# Patient Record
Sex: FEMALE | Race: AMERICAN INDIAN OR ALASKA NATIVE
[De-identification: names, ages, dates, MRNs, and addresses within clinical notes are randomized per-mention and may not be internally consistent; named-entity substitution may affect disease eponyms.]

---

## 2017-10-24 NOTE — EMERGENCY DEPARTMENT REPORT
ED General Adult HPI





- General


Chief complaint: High BP


Stated complaint: HIGH BP


Time Seen by Provider: 10/24/17 15:09


Source: patient


Mode of arrival: Ambulatory


Limitations: No Limitations





- History of Present Illness


Initial comments: 





This is a 27-year-old female who is approximately 12 weeks pregnant, was in 

outpatient clinic to get an elective termination of pregnancy, patient sent to 

the ER for asymptomatic hypertension.  Patient denies headache, neck pain, 

chest pain, abdominal pain and shortness of breath.  Has had a few episodes of 

nausea and vomiting.  Patient is occasionally consuming tobacco, and is 

consuming caffeinated beverages.  Currently does not have insurance, and does 

not have a primary care doctor or an OB/GYN doctor.  Hypertension is 

asymptomatic, blood pressure in the 150s, 160s, and as far as the patient knows

, does not have exacerbating or relieving factors.

















-: unknown


Severity scale (0 -10): 0


Improves with: none


Worsens with: none


Associated Symptoms: denies other symptoms, nausea/vomiting





- Related Data


 Previous Rx's











 Medication  Instructions  Recorded  Last Taken  Type


 


Doxylamine Succinate/Vit B6 1 each PO QHS PRN #30 tablet. 10/24/17 Unknown Rx





[Diclegis Dr 10-10 mg Tablet]    


 


Amina Root [Amina] 250 mg PO QID PRN #30 capsule 10/24/17 Unknown Rx


 


Methyldopa [Aldomet] 250 mg PO BID #60 tablet 10/24/17 Unknown Rx


 


Prenatal Vit Calc,Iron,Folic 1 each PO QDAY #30 tablet 10/24/17 Unknown Rx





[Prenatal Vitamins]    











 Allergies











Allergy/AdvReac Type Severity Reaction Status Date / Time


 


No Known Allergies Allergy   Verified 10/24/17 13:49














ED Review of Systems


ROS: 


Stated complaint: HIGH BP


Other details as noted in HPI





Constitutional: denies: fever


Eyes: denies: vision change


ENT: denies: epistaxis


Respiratory: denies: cough


Cardiovascular: denies: chest pain


Gastrointestinal: nausea, vomiting


Genitourinary: denies: dysuria


Musculoskeletal: denies: back pain


Skin: denies: lesions


Neurological: denies: weakness


Psychiatric: denies: anxiety





ED Past Medical Hx





- Past Medical History


Previous Medical History?: Yes


Hx Asthma: Yes





- Surgical History


Past Surgical History?: No





- Social History


Smoking Status: Current Every Day Smoker


Substance Use Type: Alcohol





- Medications


Home Medications: 


 Home Medications











 Medication  Instructions  Recorded  Confirmed  Last Taken  Type


 


Doxylamine Succinate/Vit B6 1 each PO QHS PRN #30 tablet. 10/24/17  Unknown Rx





[Diclegis Dr 10-10 mg Tablet]     


 


Amina Root [Amina] 250 mg PO QID PRN #30 capsule 10/24/17  Unknown Rx


 


Methyldopa [Aldomet] 250 mg PO BID #60 tablet 10/24/17  Unknown Rx


 


Prenatal Vit Calc,Iron,Folic 1 each PO QDAY #30 tablet 10/24/17  Unknown Rx





[Prenatal Vitamins]     














ED Physical Exam





- General


Limitations: No Limitations


General appearance: alert, in no apparent distress





- Head


Head exam: Present: atraumatic, normocephalic





- Eye


Eye exam: Present: normal appearance, EOMI.  Absent: nystagmus





- ENT


ENT exam: Present: normal exam, normal orophraynx, mucous membranes moist, 

normal external ear exam





- Neck


Neck exam: Present: normal inspection, full ROM.  Absent: tenderness, 

meningismus





- Respiratory


Respiratory exam: Present: normal lung sounds bilaterally.  Absent: respiratory 

distress, wheezes, rales, rhonchi, stridor, chest wall tenderness, accessory 

muscle use, decreased breath sounds, prolonged expiratory





- Cardiovascular


Cardiovascular Exam: Present: regular rate, normal rhythm, normal heart sounds.

  Absent: bradycardia, tachycardia, irregular rhythm, systolic murmur, 

diastolic murmur, rubs, gallop





- GI/Abdominal


GI/Abdominal exam: Present: soft, normal bowel sounds.  Absent: distended, 

tenderness, guarding, rebound, rigid, pulsatile mass





- Extremities Exam


Extremities exam: Present: normal inspection, full ROM, normal capillary 

refill.  Absent: pedal edema, joint swelling, calf tenderness





- Back Exam


Back exam: Present: normal inspection, full ROM.  Absent: tenderness, CVA 

tenderness (R), CVA tenderness (L), muscle spasm, paraspinal tenderness, 

vertebral tenderness





- Neurological Exam


Neurological exam: Present: alert, oriented X3, normal gait, other (Extraocular 

movements intact.  Tongue midline.  No facial droop.  Facial sensation intact 

to light touch in the V1, V2, V3 distribution bilaterally.  5 and 5 strength in 

4 extremities..  Sensation is intact to light touch in 4 extremities.).  Absent

: motor sensory deficit





- Psychiatric


Psychiatric exam: Present: normal affect, normal mood





- Skin


Skin exam: Present: warm, dry, intact, normal color.  Absent: rash





ED Course


 Vital Signs











  10/24/17 10/24/17 10/24/17





  13:44 14:57 16:00


 


Temperature 98.2 F 98.4 F 


 


Pulse Rate 71 89 


 


Respiratory 16 18 16





Rate   


 


Blood Pressure 184/115  


 


Blood Pressure  166/115 





[Right]   


 


O2 Sat by Pulse 100 100 100





Oximetry   














  10/24/17





  16:36


 


Temperature 98.1 F


 


Pulse Rate 87


 


Respiratory 16





Rate 


 


Blood Pressure 


 


Blood Pressure 160/110





[Right] 


 


O2 Sat by Pulse 





Oximetry 














ED Medical Decision Making





- Lab Data


Result diagrams: 


 10/24/17 13:53





 10/24/17 13:53








 Vital Signs











  10/24/17 10/24/17





  13:44 14:57


 


Temperature 98.2 F 98.4 F


 


Pulse Rate 71 89


 


Respiratory 16 18





Rate  


 


Blood Pressure 184/115 


 


Blood Pressure  166/115





[Right]  


 


O2 Sat by Pulse 100 100





Oximetry  














 Lab Results











  10/24/17 10/24/17 10/24/17 Range/Units





  13:53 13:53 13:53 


 


WBC  6.9    (4.5-11.0)  K/mm3


 


RBC  4.15    (3.65-5.03)  M/mm3


 


Hgb  13.6    (10.1-14.3)  gm/dl


 


Hct  38.3    (30.3-42.9)  %


 


MCV  92    (79-97)  fl


 


MCH  33 H    (28-32)  pg


 


MCHC  36 H    (30-34)  %


 


RDW  13.0 L    (13.2-15.2)  %


 


Plt Count  157    (140-440)  K/mm3


 


Lymph % (Auto)  20.1    (13.4-35.0)  %


 


Mono % (Auto)  9.2 H    (0.0-7.3)  %


 


Eos % (Auto)  1.0    (0.0-4.3)  %


 


Baso % (Auto)  0.3    (0.0-1.8)  %


 


Lymph #  1.4    (1.2-5.4)  K/mm3


 


Mono #  0.6    (0.0-0.8)  K/mm3


 


Eos #  0.1    (0.0-0.4)  K/mm3


 


Baso #  0.0    (0.0-0.1)  K/mm3


 


Seg Neutrophils %  69.4    (40.0-70.0)  %


 


Seg Neutrophils #  4.8    (1.8-7.7)  K/mm3


 


Sodium   139   (137-145)  mmol/L


 


Potassium   3.5 L   (3.6-5.0)  mmol/L


 


Chloride   100.8   ()  mmol/L


 


Carbon Dioxide   24   (22-30)  mmol/L


 


Anion Gap   18   mmol/L


 


BUN   7   (7-17)  mg/dL


 


Creatinine   0.5 L   (0.7-1.2)  mg/dL


 


Estimated GFR   > 60   ml/min


 


BUN/Creatinine Ratio   14   %


 


Glucose   133 H   ()  mg/dL


 


Calcium   8.7   (8.4-10.2)  mg/dL


 


Total Bilirubin   0.60   (0.1-1.2)  mg/dL


 


AST   18   (5-40)  units/L


 


ALT   12   (7-56)  units/L


 


Alkaline Phosphatase   34 L   ()  units/L


 


Total Protein   7.3   (6.3-8.2)  g/dL


 


Albumin   4.0   (3.9-5)  g/dL


 


Albumin/Globulin Ratio   1.2   %


 


Lipase   37   (13-60)  units/L


 


HCG, Qual    Positive  (Negative)  


 


HCG, Quant     (0-4)  mIU/mL


 


Urine Color     (Yellow)  


 


Urine Turbidity     (Clear)  


 


Urine pH     (5.0-7.0)  


 


Ur Specific Gravity     (1.003-1.030)  


 


Urine Protein     (Negative)  mg/dL


 


Urine Glucose (UA)     (Negative)  mg/dL


 


Urine Ketones     (Negative)  mg/dL


 


Urine Blood     (Negative)  


 


Urine Nitrite     (Negative)  


 


Urine Bilirubin     (Negative)  


 


Urine Urobilinogen     (<2.0)  mg/dL


 


Ur Leukocyte Esterase     (Negative)  


 


Urine WBC (Auto)     (0.0-6.0)  /HPF


 


Urine RBC (Auto)     (0.0-6.0)  /HPF


 


U Epithel Cells (Auto)     (0-13.0)  /HPF


 


Amorphous Crystals     


 


Urine Mucus     /HPF














  10/24/17 10/24/17 Range/Units





  13:53 14:04 


 


WBC    (4.5-11.0)  K/mm3


 


RBC    (3.65-5.03)  M/mm3


 


Hgb    (10.1-14.3)  gm/dl


 


Hct    (30.3-42.9)  %


 


MCV    (79-97)  fl


 


MCH    (28-32)  pg


 


MCHC    (30-34)  %


 


RDW    (13.2-15.2)  %


 


Plt Count    (140-440)  K/mm3


 


Lymph % (Auto)    (13.4-35.0)  %


 


Mono % (Auto)    (0.0-7.3)  %


 


Eos % (Auto)    (0.0-4.3)  %


 


Baso % (Auto)    (0.0-1.8)  %


 


Lymph #    (1.2-5.4)  K/mm3


 


Mono #    (0.0-0.8)  K/mm3


 


Eos #    (0.0-0.4)  K/mm3


 


Baso #    (0.0-0.1)  K/mm3


 


Seg Neutrophils %    (40.0-70.0)  %


 


Seg Neutrophils #    (1.8-7.7)  K/mm3


 


Sodium    (137-145)  mmol/L


 


Potassium    (3.6-5.0)  mmol/L


 


Chloride    ()  mmol/L


 


Carbon Dioxide    (22-30)  mmol/L


 


Anion Gap    mmol/L


 


BUN    (7-17)  mg/dL


 


Creatinine    (0.7-1.2)  mg/dL


 


Estimated GFR    ml/min


 


BUN/Creatinine Ratio    %


 


Glucose    ()  mg/dL


 


Calcium    (8.4-10.2)  mg/dL


 


Total Bilirubin    (0.1-1.2)  mg/dL


 


AST    (5-40)  units/L


 


ALT    (7-56)  units/L


 


Alkaline Phosphatase    ()  units/L


 


Total Protein    (6.3-8.2)  g/dL


 


Albumin    (3.9-5)  g/dL


 


Albumin/Globulin Ratio    %


 


Lipase    (13-60)  units/L


 


HCG, Qual    (Negative)  


 


HCG, Quant  71621 H   (0-4)  mIU/mL


 


Urine Color   Yellow  (Yellow)  


 


Urine Turbidity   Clear  (Clear)  


 


Urine pH   7.0  (5.0-7.0)  


 


Ur Specific Gravity   1.019  (1.003-1.030)  


 


Urine Protein   <15 mg/dl  (Negative)  mg/dL


 


Urine Glucose (UA)   150  (Negative)  mg/dL


 


Urine Ketones   Neg  (Negative)  mg/dL


 


Urine Blood   Neg  (Negative)  


 


Urine Nitrite   Neg  (Negative)  


 


Urine Bilirubin   Neg  (Negative)  


 


Urine Urobilinogen   < 2.0  (<2.0)  mg/dL


 


Ur Leukocyte Esterase   Neg  (Negative)  


 


Urine WBC (Auto)   2.0  (0.0-6.0)  /HPF


 


Urine RBC (Auto)   2.0  (0.0-6.0)  /HPF


 


U Epithel Cells (Auto)   2.0  (0-13.0)  /HPF


 


Amorphous Crystals   Few  


 


Urine Mucus   Few  /HPF














- Radiology Data


Radiology results: report reviewed, image reviewed





Obstetrics ultrasound demonstrates appropriate intrauterine pregnancy, heart 

rate





- Medical Decision Making





Differential diagnosis: Hypertension in pregnancy, asymptomatic, anxiety





Assessment and plan: 27-year-old female, last menstrual period ,  

2, para 0, history of elective dilatation and curettage/termination of pregnancy

, was at an outpatient gynecology clinic/ clinic, to have elective 

termination of pregnancy, found to have asymptomatic hypertension.  Patient has 

no symptoms today, endorsed some nausea and vomiting yesterday.  Walks with a 

steady gait, clinically sober, GCS of 15, NIH score of 0, no complaints at this 

time.





Laboratory studies unremarkable.





Patient has a symptomatically hypertension.  As per the [employer] of 

emergency physicians clinical policy:


1.  Are ED blood pressure readings accurate and reliable for screening 

asymptomatic patients for hypertension?





Level A recommendations. None specified. 








Level B recommendations. If blood pressure measurements are persistently 

elevated with a systolic blood pressure greater than 140 mm Hg or diastolic 

blood pressure greater than 90 mm Hg, the patient should be referred for follow-

up of possible hypertension and blood pressure management. 








Level C recommendations. Patients with a single elevated blood pressure reading 

may require further screening for hypertension in the outpatient setting. 





2.  Do asymptomatic patients with elevated blood pressures benefit from rapid 

lowering of their blood pressure?





Level A recommendations. None specified. 








Level B recommendations. (1) Initiating treatment for asymptomatic hypertension 

in the ED is not necessary when patients have follow-up; (2) Rapidly lowering 

blood pressure in asymptomatic patients in the ED is unnecessary and may be 

harmful in some patients; (3) When ED treatment for asymptomatic hypertension 

is initiated, blood pressure management should attempt to gradually lower blood 

pressure and should not be expected to be normalized during the initial ED 

visit. 














In addition, the patient is quite anxious about her procedure, and does admit 

to consuming caffeinated beverages.  This may be a contributor factor to her 

overall presentation.  Case management is contacted, patient will be given 

resources and information as to how to obtain emergency Medicaid for pregnancy.

  She will be started on nausea medication, prenatal vitamins, and methyldopa 

for a symptomatically elevated blood pressure.  Patient and family are 

instructed as to the importance of close outpatient follow-up with gynecology, 

I will withhold IV antihypertensives at this time given the patient is a 

symptomatically, and will likely not benefit from acute decrease of her blood 

pressure in the ER.


Critical care attestation.: 


If time is entered above; I have spent that time in minutes in the direct care 

of this critically ill patient, excluding procedure time.








ED Disposition


Clinical Impression: 


 Pregnancy, Elevated blood pressure reading





Disposition:  TO HOME OR SELFCARE


Is pt being admited?: No


Does the pt Need Aspirin: No


Condition: Stable


Instructions:  Pregnancy (ED), Hypertension (ED)


Additional Instructions: 


As we discussed, it typically requires multiple consecutive visits to establish 

the diagnosis of hypertension/elevated blood pressure.  Furthermore, as the 

patient does not appear to be symptomatically from elevated blood pressure, it 

is medically more dangerous to dramatically decreased blood pressure, then to 

simply follow it overtime.  Take the medications as directed, follow-up with a 

gynecologist to establish prenatal care and relationship within the next week.  

Return to the ER right away with fevers, chills, chest pain, shortness of breath

, intractable nausea or vomiting, confusion, inability to tolerate liquid feeds

, new, worsening or different symptoms.


Prescriptions: 


Doxylamine Succinate/Vit B6 [Diclegis Dr 10-10 mg Tablet] 1 each PO QHS PRN #30 

tablet.


 PRN Reason: Nausea


Amina Root [Amina] 250 mg PO QID PRN #30 capsule


 PRN Reason: Nausea


Methyldopa [Aldomet] 250 mg PO BID #60 tablet


Prenatal Vit Calc,Iron,Folic [Prenatal Vitamins] 1 each PO QDAY #30 tablet


Referrals: 


[facility] [Provider Group] - 3-5 Days


[facility] [Provider Group] - 3-5 Days


[facility] [Provider Group] - 3-5 Days

## 2017-10-24 NOTE — ULTRASOUND REPORT
ULTRASOUND OB LESS THAN 14 WEEKS FETUS



History: Hypertension.



Findings: A single intrauterine pregnancy is identified with heart rate 

measuring 172 beats per minute. Amniotic fluid volume appears normal. 

The placenta appears to be forming in the posterior fundal region. No 

subplacental collections.



Crown-rump length correlates with a 11 week, 4 day pregnancy. Estimated 

due date 05/11/18.



The ovaries are normal size, contour and echotexture. No adnexal cysts 

or.



Impression:

Viable single intrauterine pregnancy.

## 2017-10-24 NOTE — EMERGENCY DEPARTMENT REPORT
Stated Complaint: HIGH BP


Time Seen by Provider: 10/24/17 13:47





- HPI


History of Present Illness: 





patient is a 28 y/o female at 12 weeks gestation who presents due to nausea, 

vomiting and high blood pressure. Patient states that she was trying to get an 

 today but was sent to the ER due to her elevated BP. Patient denies 

any abdominal pain or headache. 





- ROS


Review of Systems: 





nausea, vomiting





- Exam


Physical Exam: 





no abdominal tenderness, alert and oriented x 3


MSE screening note: 


Focused history and physical exam performed.


Due to findings the following was ordered:











ED Disposition for MSE


Condition: Stable

## 2017-10-26 NOTE — EMERGENCY DEPARTMENT REPORT
ED General Adult HPI





- General


Chief complaint: High BP


Stated complaint: PREGNANT HTN


Time Seen by Provider: 10/26/17 15:41


Source: patient, RN notes reviewed, old records reviewed


Mode of arrival: Ambulatory


Limitations: No Limitations





- History of Present Illness


Initial comments: 





This is a 27-year-old female whom I have recently seen, we see my previous 

chart for the full details of her history and physical and prior past medical 

history.  Patient presents to the ER today with a complaint of hypertension.  

She was started on twice daily methyldopa, and reports that she still consuming 

tobacco, and still occasionally consuming energy drinks.  She reports her blood 

pressures have been varied, in the 160s, sometimes in the low 100s.  She denies 

headache, neck pain, chest pain, abdominal pain and shortness of breath.  She 

reports that she follow the instructions that she was given during her prior 

discharge, but has been unable to secure outpatient appointment for gynecologic 

care.





Patient reports that she checks her blood pressure multiple times during the 

day.


-: Gradual


Severity scale (0 -10): 0


Consistency: constant


Improves with: none


Worsens with: none


Associated Symptoms: denies: confusion, chest pain, cough, diaphoresis, fever/

chills, headaches, loss of appetite, malaise, nausea/vomiting, rash, shortness 

of breath, syncope, weakness





- Related Data


 Previous Rx's











 Medication  Instructions  Recorded  Last Taken  Type


 


Doxylamine Succinate/Vit B6 1 each PO QHS PRN #30 tablet. 10/24/17 Unknown Rx





[Diclegis Dr 10-10 mg Tablet]    


 


Amina Root [Amina] 250 mg PO QID PRN #30 capsule 10/24/17 Unknown Rx


 


Methyldopa [Aldomet] 250 mg PO BID #60 tablet 10/24/17 Unknown Rx


 


Prenatal Vit Calc,Iron,Folic 1 each PO QDAY #30 tablet 10/24/17 Unknown Rx





[Prenatal Vitamins]    











 Allergies











Allergy/AdvReac Type Severity Reaction Status Date / Time


 


No Known Allergies Allergy   Verified 10/24/17 13:49














ED Review of Systems


ROS: 


Stated complaint: PREGNANT HTN


Other details as noted in HPI





Constitutional: denies: fever


Eyes: denies: vision change


ENT: denies: epistaxis


Respiratory: denies: cough


Cardiovascular: denies: chest pain


Gastrointestinal: denies: abdominal pain


Musculoskeletal: denies: back pain


Skin: denies: lesions


Neurological: denies: headache


Psychiatric: denies: anxiety





ED Past Medical Hx





- Past Medical History


Previous Medical History?: Yes


Hx Hypertension: Yes


Hx Asthma: Yes





- Surgical History


Past Surgical History?: Yes


Additional Surgical History: D&C/miscarriage





- Social History


Smoking Status: Current Every Day Smoker


Substance Use Type: Alcohol





- Medications


Home Medications: 


 Home Medications











 Medication  Instructions  Recorded  Confirmed  Last Taken  Type


 


Doxylamine Succinate/Vit B6 1 each PO QHS PRN #30 tablet.dr 10/24/17  Unknown Rx





[Diclegis Dr 10-10 mg Tablet]     


 


Amina Root [Amina] 250 mg PO QID PRN #30 capsule 10/24/17  Unknown Rx


 


Methyldopa [Aldomet] 250 mg PO BID #60 tablet 10/24/17  Unknown Rx


 


Prenatal Vit Calc,Iron,Folic 1 each PO QDAY #30 tablet 10/24/17  Unknown Rx





[Prenatal Vitamins]     














ED Physical Exam





- General


Limitations: No Limitations


General appearance: alert, in no apparent distress





- Head


Head exam: Present: atraumatic, normocephalic





- Eye


Eye exam: Present: normal appearance, PERRL, EOMI, other (visual acuity intact 

to finger counting, color perception, reading at a close distance).  Absent: 

nystagmus





- ENT


ENT exam: Present: normal exam, normal orophraynx, mucous membranes moist, 

normal external ear exam





- Neck


Neck exam: Present: normal inspection, full ROM.  Absent: tenderness, 

meningismus





- Respiratory


Respiratory exam: Present: normal lung sounds bilaterally.  Absent: respiratory 

distress, wheezes, rales, rhonchi, stridor, chest wall tenderness, accessory 

muscle use, decreased breath sounds, prolonged expiratory





- Cardiovascular


Cardiovascular Exam: Present: regular rate, normal rhythm, normal heart sounds.

  Absent: bradycardia, tachycardia, irregular rhythm, systolic murmur, 

diastolic murmur, rubs, gallop





- GI/Abdominal


GI/Abdominal exam: Present: soft, normal bowel sounds.  Absent: distended, 

tenderness, guarding, rebound, rigid, pulsatile mass





- Extremities Exam


Extremities exam: Present: normal inspection, full ROM.  Absent: calf tenderness





- Back Exam


Back exam: Present: normal inspection, full ROM.  Absent: CVA tenderness (R), 

CVA tenderness (L), paraspinal tenderness, vertebral tenderness





- Neurological Exam


Neurological exam: Present: alert, oriented X3, normal gait, other (Extraocular 

movements intact.  Tongue midline.  No facial droop.  Facial sensation intact 

to light touch in the V1, V2, V3 distribution bilaterally.  5 and 5 strength in 

4 extremities..  Sensation is intact to light touch in 4 extremities.).  Absent

: motor sensory deficit





- Psychiatric


Psychiatric exam: Present: normal affect, normal mood





- Skin


Skin exam: Present: warm, dry, intact, normal color.  Absent: rash





ED Course


 Vital Signs











  10/26/17 10/26/17 10/26/17





  10:49 15:46 15:47


 


Temperature 98.1 F 98.4 F 


 


Pulse Rate 99 H 84 


 


Respiratory 18 16 16





Rate   


 


Blood Pressure 163/113  


 


Blood Pressure  152/105 





[Left]   


 


O2 Sat by Pulse 100 98 99





Oximetry   














- Reevaluation(s)


Reevaluation #1: 





10/26/17 20:20


Patient eating in the ER without difficulty, playing on the same of thumb 

without difficulty, looks quite comfortable, and in no distress.  The 

importance of medication compliance, outpatient follow-up was emphasized to 

patient and family.  In addition, the family given the phone number to case 

management to contact in the morning so they can further clarify how the 

patient may pursue emergency Medicaid.





ED Medical Decision Making





- Lab Data


Result diagrams: 


 10/26/17 11:16





 10/26/17 11:14








 Vital Signs











  10/26/17 10/26/17 10/26/17





  10:49 15:46 15:47


 


Temperature 98.1 F 98.4 F 


 


Pulse Rate 99 H 84 


 


Respiratory 18 16 16





Rate   


 


Blood Pressure 163/113  


 


Blood Pressure  152/105 





[Left]   


 


O2 Sat by Pulse 100 98 99





Oximetry   














 Lab Results











  10/26/17 10/26/17 10/26/17 Range/Units





  11:03 11:14 11:16 


 


WBC    6.9  (4.5-11.0)  K/mm3


 


RBC    4.31  (3.65-5.03)  M/mm3


 


Hgb    13.6  (10.1-14.3)  gm/dl


 


Hct    40.6  (30.3-42.9)  %


 


MCV    94  (79-97)  fl


 


MCH    32  (28-32)  pg


 


MCHC    34  (30-34)  %


 


RDW    13.0 L  (13.2-15.2)  %


 


Plt Count    160  (140-440)  K/mm3


 


Lymph % (Auto)    22.6  (13.4-35.0)  %


 


Mono % (Auto)    9.2 H  (0.0-7.3)  %


 


Eos % (Auto)    1.6  (0.0-4.3)  %


 


Baso % (Auto)    0.4  (0.0-1.8)  %


 


Lymph #    1.6  (1.2-5.4)  K/mm3


 


Mono #    0.6  (0.0-0.8)  K/mm3


 


Eos #    0.1  (0.0-0.4)  K/mm3


 


Baso #    0.0  (0.0-0.1)  K/mm3


 


Seg Neutrophils %    66.2  (40.0-70.0)  %


 


Seg Neutrophils #    4.6  (1.8-7.7)  K/mm3


 


Sodium   137   (137-145)  mmol/L


 


Potassium   4.2   (3.6-5.0)  mmol/L


 


Chloride   101.3   ()  mmol/L


 


Carbon Dioxide   25   (22-30)  mmol/L


 


Anion Gap   15   mmol/L


 


BUN   7   (7-17)  mg/dL


 


Creatinine   0.6 L   (0.7-1.2)  mg/dL


 


Estimated GFR   > 60   ml/min


 


BUN/Creatinine Ratio   12   %


 


Glucose   65   ()  mg/dL


 


Calcium   8.8   (8.4-10.2)  mg/dL


 


Urine Color  Yellow    (Yellow)  


 


Urine Turbidity  Clear    (Clear)  


 


Urine pH  7.0    (5.0-7.0)  


 


Ur Specific Gravity  1.016    (1.003-1.030)  


 


Urine Protein  <15 mg/dl    (Negative)  mg/dL


 


Urine Glucose (UA)  Neg    (Negative)  mg/dL


 


Urine Ketones  Neg    (Negative)  mg/dL


 


Urine Blood  Neg    (Negative)  


 


Urine Nitrite  Neg    (Negative)  


 


Urine Bilirubin  Neg    (Negative)  


 


Urine Urobilinogen  < 2.0    (<2.0)  mg/dL


 


Ur Leukocyte Esterase  Neg    (Negative)  


 


Urine WBC (Auto)  3.0    (0.0-6.0)  /HPF


 


Urine RBC (Auto)  1.0    (0.0-6.0)  /HPF


 


U Epithel Cells (Auto)  4.0    (0-13.0)  /HPF


 


Urine Bacteria (Auto)  1+    (Negative)  /HPF


 


Amorphous Crystals  Few    


 


Urine Mucus  Few    /HPF














- Medical Decision Making





Differential diagnosis: Hypertension, anxiety, pregnancy





Assessment and plan: 27-year-old female, previously evaluated by me, recently 

prescribed methyldopa for elevated blood pressure in pregnancy, presenting with 

multiple elevated blood pressure readings at home.  Patient is quite anxious 

about her blood pressure, and this is most likely helping to induce elevated 

blood pressure.  Patient is afebrile with reassuring vital signs with the 

exception of elevated blood pressure and the ER, has a GCS of 15, with an NIH 

score of 0, and clinically appears quite comfortable.  Reassured patient that 

she should only take her blood pressure once in the  morning and once at night, 

to alleviate anxiety about her elevated blood pressure.  She is also encouraged 

to follow up with outpatient gynecology as previously instructed.  The patient 

is quite comfortable and appears relieved, and states she will do so.


Critical care attestation.: 


If time is entered above; I have spent that time in minutes in the direct care 

of this critically ill patient, excluding procedure time.








ED Disposition


Clinical Impression: 


 Pregnancy, Elevated blood pressure reading





Disposition: DC-01 TO HOME OR SELFCARE


Is pt being admited?: No


Does the pt Need Aspirin: No


Condition: Stable


Additional Instructions: 


Increase methyldopa to 250 milligrams every 8 hours.  Follow up with a 

gynecologist/OB/GYN doctor as soon as possible to establish a relationship.  

Check blood pressure once in the morning, and once at night, and discontinue 

the consumption of tobacco/cigarettes.  Avoid consumption of stimulating 

substances, such as soda, energy drinks.  Return to the ER went away with fevers

, chills, chest pain, shortness of breath, intractable nausea or vomiting, 

confusion, inability tolerate liquid feeds.


Referrals: 


[facility] [Primary Care Provider] - 3-5 Days


[facility] [Provider Group] - 3-5 Days


[facility] [Provider Group] - 3-5 Days


[facility] [Provider Group] - 3-5 Days

## 2018-04-23 ENCOUNTER — HOSPITAL ENCOUNTER (INPATIENT)
Dept: HOSPITAL 5 - LD | Age: 28
LOS: 1 days | Discharge: LEFT BEFORE DISCHARGE | DRG: 781 | End: 2018-04-24
Attending: OBSTETRICS & GYNECOLOGY | Admitting: OBSTETRICS & GYNECOLOGY
Payer: MEDICAID

## 2018-04-23 DIAGNOSIS — O11.3: Primary | ICD-10-CM

## 2018-04-23 DIAGNOSIS — Z53.21: ICD-10-CM

## 2018-04-23 DIAGNOSIS — Z83.3: ICD-10-CM

## 2018-04-23 DIAGNOSIS — Z82.49: ICD-10-CM

## 2018-04-23 DIAGNOSIS — J45.909: ICD-10-CM

## 2018-04-23 DIAGNOSIS — Z3A.37: ICD-10-CM

## 2018-04-23 DIAGNOSIS — Z91.19: ICD-10-CM

## 2018-04-23 DIAGNOSIS — O99.513: ICD-10-CM

## 2018-04-23 LAB
ALT SERPL-CCNC: 8 UNITS/L (ref 7–56)
HCT VFR BLD CALC: 34.9 % (ref 30.3–42.9)
HGB BLD-MCNC: 12.4 GM/DL (ref 10.1–14.3)
MCH RBC QN AUTO: 33 PG (ref 28–32)
MCHC RBC AUTO-ENTMCNC: 36 % (ref 30–34)
MCV RBC AUTO: 93 FL (ref 79–97)
PLATELET # BLD: 157 K/MM3 (ref 140–440)
RBC # BLD AUTO: 3.76 M/MM3 (ref 3.65–5.03)
URATE SERPL-MCNC: 4.2 MG/DL (ref 3.5–7.6)

## 2018-04-23 PROCEDURE — 36415 COLL VENOUS BLD VENIPUNCTURE: CPT

## 2018-04-23 PROCEDURE — 86901 BLOOD TYPING SEROLOGIC RH(D): CPT

## 2018-04-23 PROCEDURE — 84450 TRANSFERASE (AST) (SGOT): CPT

## 2018-04-23 PROCEDURE — 86900 BLOOD TYPING SEROLOGIC ABO: CPT

## 2018-04-23 PROCEDURE — 84460 ALANINE AMINO (ALT) (SGPT): CPT

## 2018-04-23 PROCEDURE — 82565 ASSAY OF CREATININE: CPT

## 2018-04-23 PROCEDURE — 83615 LACTATE (LD) (LDH) ENZYME: CPT

## 2018-04-23 PROCEDURE — 3E0P7VZ INTRODUCTION OF HORMONE INTO FEMALE REPRODUCTIVE, VIA NATURAL OR ARTIFICIAL OPENING: ICD-10-PCS | Performed by: PEDIATRICS

## 2018-04-23 PROCEDURE — 84550 ASSAY OF BLOOD/URIC ACID: CPT

## 2018-04-23 PROCEDURE — 85027 COMPLETE CBC AUTOMATED: CPT

## 2018-04-23 PROCEDURE — 86592 SYPHILIS TEST NON-TREP QUAL: CPT

## 2018-04-23 PROCEDURE — 86850 RBC ANTIBODY SCREEN: CPT

## 2018-04-23 PROCEDURE — 59200 INSERT CERVICAL DILATOR: CPT

## 2018-04-23 NOTE — HISTORY AND PHYSICAL REPORT
History of Present Illness


Date of examination: 18


Date of admission: 


18 13:31





Chief complaint: 





sent for induction of labor 


History of present illness: 





Pt is a 28 year old -American female  ERMA 18 at 37w3d who 

presents to the office after loss of follow up since 34 wks. She has a h/o non-

compliance and chronic hypertension with superimposed preeclampsia on Aldomet 

250 mg BID. She denies headache, blurry vision and RUQ pain at this time. She 

reports rare contractions, and denies vaginal bleeding and leakage of fluid. 

She has had limited prenatal care at [facility] with comanagement 

with AMFM secondary to chronic hypertension. Her GBS status is unknown. 





Past History


Past Medical History: asthma, hypertension (per HPI )


Past Surgical History: no surgical history


GYN History: trichomonas (treated this pregnancy, with negative test of cure )


Family/Genetic History: diabetes, hypertension


Social history: no significant social history





- Obstetrical History


Expected Date of Delivery: 18 (3)


Actual Gestation: 37 Week(s) 3 Day(s) 


: 2


Para: 0


Hx # Term Pregnancies: 0


Number of  Pregnancies: 0


Spontaneous Abortions: 0


Induced : 1


Number of Living Children: 0





Medications and Allergies


 Allergies











Allergy/AdvReac Type Severity Reaction Status Date / Time


 


No Known Allergies Allergy   Verified 10/24/17 13:49











 Home Medications











 Medication  Instructions  Recorded  Confirmed  Last Taken  Type


 


Doxylamine Succinate/Vit B6 1 each PO QHS PRN #30 tablet. 10/24/17  Unknown Rx





[Diclegis Dr 10-10 mg Tablet]     


 


Amina Root [Amina] 250 mg PO QID PRN #30 capsule 10/24/17  Unknown Rx


 


Methyldopa [Aldomet] 250 mg PO BID #60 tablet 10/24/17  Unknown Rx


 


Prenatal Vit Calc,Iron,Folic 1 each PO QDAY #30 tablet 10/24/17  Unknown Rx





[Prenatal Vitamins]     











Active Meds: 


Active Medications





Butorphanol Tartrate (Stadol)  2 mg IV Q2H PRN


   PRN Reason: Pain , Severe (7-10)


Dinoprostone (Cervidil)  10 mg VG ONCE ONE


   Stop: 18 14:35


Ephedrine Sulfate (Ephedrine Sulfate)  10 mg IV Q2M PRN


   PRN Reason: Hypotension


Fentanyl (Sublimaze)  100 mcg IV Q2H PRN


   PRN Reason: Labor Pain


Ampicillin Sodium (Ampicillin/Ns 1 Gm/50 Ml)  1 gm in 50 mls @ 100 mls/hr IV 

Q4H NAWAF; Protocol


Ampicillin Sodium (Polycillin/Ns 2 Gm/100 Ml)  2 gm in 100 mls @ 100 mls/hr IV 

ONCE ONE; Protocol


   Stop: 18 14:51


Lactated Ringer's (Lactated Ringers)  1,000 mls @ 125 mls/hr IV AS DIRECT NAWAF


Oxytocin/Sodium Chloride (Pitocin/Ns 20 Unit/1000ml Drip)  20 units in 1,000 

mls @ 125 mls/hr IV AS DIRECT NAWAF


Oxytocin/Sodium Chloride (Pitocin/Ns 30 Unit/500ml)  30 units in 500 mls @ 4 mls

/hr IV TITR NAWAF; Protocol


Methyldopa (Aldomet)  250 mg PO Q12HR NAWAF


Mineral Oil (Mineral Oil)  30 ml PO QHS PRN


   PRN Reason: Constipation


Naloxone HCl (Narcan 0.4 Mg/1 Ml)  0.1 mg IV Q2MIN PRN


   PRN Reason: Res Rate </= 8 or 02 SAT < 92%


Ondansetron HCl (Zofran)  4 mg IV Q8H PRN


   PRN Reason: Nausea And Vomiting


Terbutaline Sulfate (Brethine)  0.25 mg SUB-Q ONCE PRN


   PRN Reason: Hyperstimulation/Hypertonicity


Terbutaline Sulfate (Brethine)  0.25 mg IVP ONCE PRN


   PRN Reason: Hyperstimulation/Hypertonicity











Review of Systems


All systems: negative





- Physical Exam


Breasts: Positive: deferred


Cardiovascular: Regular rate


Lungs: Positive: Clear to auscultation


Abdomen: Positive: soft (gravid )


Genitourinary (Female): Positive: normal external genitalia


Uterus: Positive: enlarged (gravid )


Extremities: Positive: edema (trace )





- Obstetrical


FHR: auscultation normal


Uterine Contraction Monitor Mode: External


Uterine Contraction Pattern: Absent


Uterine Tone Measurement Phase: Resting





Results


All other labs normal.








Assessment and Plan





A: IUP at 37w3d


    Chronic HTN with superimposed Preeclampsia- currently normotensive 


    Non-compliance


    GBS unknown 


    Asthma 





P: Admit to labor and delivery for induction of labor


    GBS prophylaxis 


    Cervidil induction


    PIH panel 


    Magnesium sulfate for signs of severe disease

## 2018-04-24 VITALS — DIASTOLIC BLOOD PRESSURE: 72 MMHG | SYSTOLIC BLOOD PRESSURE: 114 MMHG

## 2018-04-24 PROCEDURE — 3E033VJ INTRODUCTION OF OTHER HORMONE INTO PERIPHERAL VEIN, PERCUTANEOUS APPROACH: ICD-10-PCS | Performed by: PEDIATRICS

## 2018-04-24 RX ADMIN — BUTORPHANOL TARTRATE PRN MG: 2 INJECTION, SOLUTION INTRAMUSCULAR; INTRAVENOUS at 19:30

## 2018-04-24 RX ADMIN — BUTORPHANOL TARTRATE PRN MG: 2 INJECTION, SOLUTION INTRAMUSCULAR; INTRAVENOUS at 12:55

## 2018-04-24 RX ADMIN — BUTORPHANOL TARTRATE PRN MG: 2 INJECTION, SOLUTION INTRAMUSCULAR; INTRAVENOUS at 09:20

## 2018-04-24 RX ADMIN — SODIUM CHLORIDE, SODIUM LACTATE, POTASSIUM CHLORIDE, AND CALCIUM CHLORIDE SCH MLS/HR: .6; .31; .03; .02 INJECTION, SOLUTION INTRAVENOUS at 12:55

## 2018-04-24 RX ADMIN — FENTANYL CITRATE PRN MCG: 50 INJECTION, SOLUTION INTRAMUSCULAR; INTRAVENOUS at 02:01

## 2018-04-24 RX ADMIN — SODIUM CHLORIDE, SODIUM LACTATE, POTASSIUM CHLORIDE, AND CALCIUM CHLORIDE SCH MLS/HR: .6; .31; .03; .02 INJECTION, SOLUTION INTRAVENOUS at 00:05

## 2018-04-24 RX ADMIN — FENTANYL CITRATE PRN MCG: 50 INJECTION, SOLUTION INTRAMUSCULAR; INTRAVENOUS at 16:47

## 2018-04-24 NOTE — EVENT NOTE
Date: 04/24/18





Pt uncomfortable with contractions. FHTs Category II. SVE: fingertip. Plan to 

allow pt to shower and and eat breakfast and continue induction with 

misoprostol. Closely monitor maternal and fetal status.

## 2018-04-24 NOTE — EVENT NOTE
Date: 04/24/18





Pt very uncomfortable with contractions. Category I tracing. SVE: fingertip s/p 

12 hrs of cervidil. Antonio too frequently for cytotec. Begin low dose 

pitocin for cervical ripening.

## 2018-04-25 NOTE — EVENT NOTE
Date: 04/25/18





Late entry. Pt expressed discontent secondary to her cervix not changing during 

the induction process, despite being at the hospital for fewer then 30 hours at 

that time, and after being informed that her induction could take two to three 

days. The patient decided that she wanted to go home and come back when she was 

in labor against medical advice. She completed the AMA form and left the 

hospital on the evening of 4/24/18.

## 2018-04-25 NOTE — DISCHARGE SUMMARY
Providers





- Providers


Date of Admission: 


04/23/18 13:31





Date of discharge: 04/24/18


Attending physician: 


WENDY TRIPLETT





Primary care physician: 


WENDY TRIPLETT








Hospitalization


Reason for admission: induction of labor


Discharge diagnosis: other (chronic hypertension with superimposed preeclampsia

, IUP at 37 wks )


Hospital course: 





Pt was admitted for induction of labor per [facility] recommendations for delivery at 

37 wks secondary to chronic hypertension with superimposed preeclampsia. She 

received cervidil and IV pitocin for about 30 hours with minimal cervical 

change over the time. The patient then decided that she no longer wanted to 

continue the induction and that she wanted to go home. She signed the Against 

Medical Advice form and went home undelivered. 


Condition at discharge: Stable


Disposition: DC-07 LEFT AGAINST MED ADVICE





- Discharge Diagnoses


(1) Pre-eclampsia superimposed on chronic hypertension


Status: Acute   





(2) Pregnancy


Status: Acute   


Qualifiers: 


   Weeks of gestation: 37 weeks   Qualified Code(s): Z3A.37 - 37 weeks 

gestation of pregnancy   





(3) Insufficient prenatal care in third trimester


Status: Acute   





(4) Non-compliant pregnant patient


Status: Acute   


Qualifiers: 


   Trimester: third trimester   Qualified Code(s): O09.893 - Supervision of 

other high risk pregnancies, third trimester; Z91.19 - Patient's noncompliance 

with other medical treatment and regimen   





Plan





- Provider Discharge Summary


Additional instructions: 


[]  Smoking cessation referral if applicable(refer to patient education folder 

for contact #)


[]  Refer to [facility] Booklet








Call your doctor immediately for:


* Fever > 100.5


* Heavy vaginal bleeding ( >1 pad per hour)


* Severe persistent headache


* Shortness of breath


* Reddened, hot, painful area to leg or breast


* Drainage or odor from incision.





* Keep incision clean and dry at all times and follow doctor's instructions 

regarding bathing/showering











- Follow up plan


Follow up: 


WENDY TRIPLETT MD [Primary Care Provider] - 7 Days

## 2018-04-27 ENCOUNTER — HOSPITAL ENCOUNTER (OUTPATIENT)
Dept: HOSPITAL 5 - TRG | Age: 28
End: 2018-04-27
Attending: OBSTETRICS & GYNECOLOGY
Payer: MEDICAID

## 2018-04-27 DIAGNOSIS — O47.03: ICD-10-CM

## 2018-04-27 DIAGNOSIS — F17.200: ICD-10-CM

## 2018-04-27 DIAGNOSIS — Z3A.38: ICD-10-CM

## 2018-04-27 DIAGNOSIS — O99.333: ICD-10-CM

## 2018-04-27 DIAGNOSIS — O11.3: Primary | ICD-10-CM

## 2018-04-27 LAB
ALT SERPL-CCNC: < 5 UNITS/L (ref 7–56)
BILIRUB UR QL STRIP: (no result)
BLOOD UR QL VISUAL: (no result)
HCT VFR BLD CALC: 34.4 % (ref 30.3–42.9)
HGB BLD-MCNC: 12.1 GM/DL (ref 10.1–14.3)
MCH RBC QN AUTO: 33 PG (ref 28–32)
MCHC RBC AUTO-ENTMCNC: 35 % (ref 30–34)
MCV RBC AUTO: 93 FL (ref 79–97)
PH UR STRIP: 6 [PH] (ref 5–7)
PLATELET # BLD: 149 K/MM3 (ref 140–440)
PROT UR STRIP-MCNC: (no result) MG/DL
RBC # BLD AUTO: 3.71 M/MM3 (ref 3.65–5.03)
RBC #/AREA URNS HPF: 1 /HPF (ref 0–6)
URATE SERPL-MCNC: 5 MG/DL (ref 3.5–7.6)
UROBILINOGEN UR-MCNC: < 2 MG/DL (ref ?–2)
WBC #/AREA URNS HPF: < 1 /HPF (ref 0–6)

## 2018-04-27 PROCEDURE — 82565 ASSAY OF CREATININE: CPT

## 2018-04-27 PROCEDURE — 84450 TRANSFERASE (AST) (SGOT): CPT

## 2018-04-27 PROCEDURE — 84550 ASSAY OF BLOOD/URIC ACID: CPT

## 2018-04-27 PROCEDURE — 81001 URINALYSIS AUTO W/SCOPE: CPT

## 2018-04-27 PROCEDURE — 83615 LACTATE (LD) (LDH) ENZYME: CPT

## 2018-04-27 PROCEDURE — 86901 BLOOD TYPING SEROLOGIC RH(D): CPT

## 2018-04-27 PROCEDURE — 36415 COLL VENOUS BLD VENIPUNCTURE: CPT

## 2018-04-27 PROCEDURE — 84460 ALANINE AMINO (ALT) (SGPT): CPT

## 2018-04-27 PROCEDURE — 76815 OB US LIMITED FETUS(S): CPT

## 2018-04-27 PROCEDURE — 85018 HEMOGLOBIN: CPT

## 2018-04-27 PROCEDURE — 85027 COMPLETE CBC AUTOMATED: CPT

## 2018-04-27 PROCEDURE — 85014 HEMATOCRIT: CPT

## 2018-04-27 PROCEDURE — 86900 BLOOD TYPING SEROLOGIC ABO: CPT

## 2018-04-27 PROCEDURE — 86850 RBC ANTIBODY SCREEN: CPT

## 2018-04-27 PROCEDURE — 76819 FETAL BIOPHYS PROFIL W/O NST: CPT

## 2018-04-27 RX ADMIN — FENTANYL CITRATE PRN MCG: 50 INJECTION, SOLUTION INTRAMUSCULAR; INTRAVENOUS at 22:42

## 2018-04-27 NOTE — ULTRASOUND REPORT
History: Fetal well being. HIWOT.



Findings:

Gestation: Single



Fetal Position: Cephalic



Amniotic Fluid: 

  HIWOT = 11.3 cm



Fetal Heart Rate:

  137 BPM



BIOPHYSICAL PROFILE:



2 - Fetal breathing movements



2 - Fetal movements



2 - Fetal posture and tone



2 - Qualitative amniotic fluid volume



8 - TOTAL SCORE OF POSSIBLE 8



Fetal Heart Rate (bpm) 137

## 2018-04-27 NOTE — HISTORY AND PHYSICAL REPORT
History of Present Illness


Date of examination: 18


Date of admission: 





18





Chief complaint: 


Back again for IOL per MFM for superimposed preeclampsia 





History of present illness: 


This is a 29 yo  at 38 weeks that was here several days for IOL secondary 

to recommendations of MFM for IOL. Patietn stayed for 2 days and then left AMA. 

Dr. Up spoke with patient yesterday and convinced the patient to return to 

Hospitals in [place] and patient came today for IOL. The patient is a patient of [facility]. 

She has cHTN on aldomet 250mg po bid She was treated for trich in this 

pregnancy with raeann neg. She also has asthma 








Past History


Past Medical History: asthma, hypertension


Past Surgical History: no surgical history


Family/Genetic History: diabetes, heart disease


Social history: single.  denies: smoking, alcohol abuse, prescription drug abuse





- Obstetrical History


Expected Date of Delivery: 18


Actual Gestation: 38 Week(s) 0 Day(s) 


: 2


Para: 0


Hx # Term Pregnancies: 0


Number of  Pregnancies: 0


Spontaneous Abortions: 0


Induced : 1


Number of Living Children: 0





Medications and Allergies


 Allergies











Allergy/AdvReac Type Severity Reaction Status Date / Time


 


No Known Allergies Allergy   Verified 10/24/17 13:49











 Home Medications











 Medication  Instructions  Recorded  Confirmed  Last Taken  Type


 


Doxylamine Succinate/Vit B6 1 each PO QHS PRN #30 tablet. 10/24/17 04/23/18 

Unknown Rx





[Diclegis Dr 10-10 mg Tablet]     


 


Amina Root [Amina] 250 mg PO QID PRN #30 capsule 10/24/17 04/23/18 Unknown Rx


 


Methyldopa [Aldomet] 250 mg PO BID #60 tablet 10/24/17 04/23/18 Unknown Rx


 


Prenatal Vit Calc,Iron,Folic 1 each PO QDAY #30 tablet 10/24/17 04/23/18 04/23/

18 08:00 Rx





[Prenatal Vitamins]     











Active Meds: 


Active Medications





Butorphanol Tartrate (Stadol)  2 mg IV Q2H PRN


   PRN Reason: Pain , Severe (7-10)


Ephedrine Sulfate (Ephedrine Sulfate)  10 mg IV Q2M PRN


   PRN Reason: Hypotension


Fentanyl (Sublimaze)  100 mcg IV Q2H PRN


   PRN Reason: Labor Pain


Lactated Ringer's (Lactated Ringers)  1,000 mls @ 125 mls/hr IV AS DIRECT NAWAF


Mineral Oil (Mineral Oil)  30 ml PO QHS PRN


   PRN Reason: Constipation


Naloxone HCl (Narcan 0.4 Mg/1 Ml)  0.1 mg IV Q2MIN PRN


   PRN Reason: Res Rate </= 8 or 02 SAT < 92%


Ondansetron HCl (Zofran)  4 mg IV Q8H PRN


   PRN Reason: Nausea And Vomiting


Promethazine HCl (Phenergan)  25 mg PO Q6H PRN


   PRN Reason: Nausea And Vomiting


Terbutaline Sulfate (Brethine)  0.25 mg SUB-Q ONCE PRN


   PRN Reason: Hyperstimulation/Hypertonicity


Terbutaline Sulfate (Brethine)  0.25 mg IVP ONCE PRN


   PRN Reason: Hyperstimulation/Hypertonicity











Review of Systems


All systems: negative





- Vital Signs


Vital signs: 


 Vital Signs











Temp Resp BP


 


 97.9 F   16   122/88 


 


 18 14:00  18 14:00  18 14:00








 











Temp Pulse Resp BP Pulse Ox


 


 97.9 F      16   122/88    


 


 18 14:00     18 14:00  18 14:00   














- Physical Exam


Breasts: Positive: normal


Cardiovascular: Regular rate, Normal S1


Lungs: Positive: Clear to auscultation, Normal air movement


Abdomen: Positive: normal appearance, soft, normal bowel sounds.  Negative: 

distention, tenderness, guarding


Genitourinary (Female): Positive: normal external genitalia, normal perenium


Vulva: both: normal


Vagina: Positive: normal moisture


Uterus: Positive: normal size


Anus/Rectum: Positive: normal perianal skin


Extremities: Positive: normal


Deep Tendon Reflex Grade: Normal +2





- Obstetrical


Cervical Dilatation: 0.5


Cervical Effacement Percentage: 20


Fetal station: -1


Uterine Contraction Pattern: Absent


Uterine Tone Measurement Phase: Resting





Results


All other labs normal.








Assessment and Plan





A/P IUP 37 weeks


     cHTN/superimposed pree per MFM


     recommendations IOL 


     start with cervidil 


     non compliant GBS was not performed will need to treat in active labor

## 2018-04-28 RX ADMIN — ZOLPIDEM TARTRATE PRN MG: 10 TABLET, FILM COATED ORAL at 01:07

## 2018-04-28 RX ADMIN — ZOLPIDEM TARTRATE PRN MG: 10 TABLET, FILM COATED ORAL at 22:47

## 2018-04-28 RX ADMIN — SODIUM CHLORIDE, SODIUM LACTATE, POTASSIUM CHLORIDE, AND CALCIUM CHLORIDE SCH MLS/HR: .6; .31; .03; .02 INJECTION, SOLUTION INTRAVENOUS at 17:29

## 2018-04-28 RX ADMIN — SODIUM CHLORIDE, SODIUM LACTATE, POTASSIUM CHLORIDE, AND CALCIUM CHLORIDE SCH MLS/HR: .6; .31; .03; .02 INJECTION, SOLUTION INTRAVENOUS at 06:14

## 2018-04-28 RX ADMIN — ONDANSETRON PRN MG: 2 INJECTION INTRAMUSCULAR; INTRAVENOUS at 00:00

## 2018-04-28 RX ADMIN — METHYLDOPA SCH MG: 250 TABLET ORAL at 22:46

## 2018-04-28 RX ADMIN — FENTANYL CITRATE PRN MCG: 50 INJECTION, SOLUTION INTRAMUSCULAR; INTRAVENOUS at 06:02

## 2018-04-28 RX ADMIN — OXYTOCIN SCH MLS/HR: 10 INJECTION, SOLUTION INTRAMUSCULAR; INTRAVENOUS at 13:52

## 2018-04-28 RX ADMIN — FENTANYL CITRATE PRN MCG: 50 INJECTION, SOLUTION INTRAMUSCULAR; INTRAVENOUS at 01:15

## 2018-04-28 RX ADMIN — FENTANYL CITRATE PRN MCG: 50 INJECTION, SOLUTION INTRAMUSCULAR; INTRAVENOUS at 17:23

## 2018-04-28 NOTE — PROGRESS NOTE
Assessment and Plan





IUP at 38 weeks here for induction of labor for chronic hypertension and 

superimposed preeclampsia per [facility]. Patient received cervidil overnight and is 

now 1-2cm. She has had only one elevated blood pressure last night for which 

she received Hydralazine. Her other pressures have ranged from 130s-140s/80s-

90s.  Will continue with induction of labor. Anticipate 





Subjective





- Subjective


Date of service: 18


Principal diagnosis: Chronic hypertension


Patient reports: contractions





Objective





- Vital Signs


Vital Signs: 


 Vital Signs - 12hr











  18





  23:58 01:43 06:07


 


Temperature 96 F L  97.1 F L


 


Pulse Rate 65 65 78


 


Respiratory 20  18





Rate   


 


Blood Pressure  181/95 


 


Blood Pressure   143/104





[Left]   














  18





  07:33


 


Temperature 97.5 F L


 


Pulse Rate 59 L


 


Respiratory 20





Rate 


 


Blood Pressure 


 


Blood Pressure 123/75





[Left] 














- Exam


Breasts: deferred


Cardiovascular: Regular rate, Normal S1, Normal S2


Lungs: Clear to auscultation, Normal air movement


Abdomen: Present: normal appearance, soft, normal bowel sounds


Cervical Dilatation: 1


Cervical Effacement Percentage: 70


Uterine Contraction Pattern: Irregular


Deep Tendon Reflex Grade: Normal +2





- Labs


Labs: 


 Abnormal Labs











  18





  15:37 15:37


 


MCH  33 H 


 


MCHC  35 H 


 


RDW  12.7 L 


 


Creatinine   0.5 L


 


ALT   < 5 L


 


Lactate Dehydrogenase   209 H








 Laboratory Results - last 24 hr











  18





  14:00 15:37 15:37


 


WBC   5.4 


 


RBC   3.71 


 


Hgb   12.1 


 


Hct   34.4 


 


MCV   93 


 


MCH   33 H 


 


MCHC   35 H 


 


RDW   12.7 L 


 


Plt Count   149 


 


Creatinine    0.5 L


 


Estimated GFR    > 60


 


Uric Acid    5.0


 


AST    19


 


ALT    < 5 L


 


Lactate Dehydrogenase    209 H


 


Urine Color  Yellow  


 


Urine Turbidity  Clear  


 


Urine pH  6.0  


 


Ur Specific Gravity  1.009  


 


Urine Protein  <15 mg/dl  


 


Urine Glucose (UA)  >=500  


 


Urine Ketones  Neg  


 


Urine Blood  Neg  


 


Urine Nitrite  Neg  


 


Urine Bilirubin  Neg  


 


Urine Urobilinogen  < 2.0  


 


Ur Leukocyte Esterase  Neg  


 


Urine WBC (Auto)  < 1.0  


 


Urine RBC (Auto)  1.0  


 


U Epithel Cells (Auto)  2.0  


 


Blood Type   


 


Antibody Screen   














  18





  15:37


 


WBC 


 


RBC 


 


Hgb 


 


Hct 


 


MCV 


 


MCH 


 


MCHC 


 


RDW 


 


Plt Count 


 


Creatinine 


 


Estimated GFR 


 


Uric Acid 


 


AST 


 


ALT 


 


Lactate Dehydrogenase 


 


Urine Color 


 


Urine Turbidity 


 


Urine pH 


 


Ur Specific Gravity 


 


Urine Protein 


 


Urine Glucose (UA) 


 


Urine Ketones 


 


Urine Blood 


 


Urine Nitrite 


 


Urine Bilirubin 


 


Urine Urobilinogen 


 


Ur Leukocyte Esterase 


 


Urine WBC (Auto) 


 


Urine RBC (Auto) 


 


U Epithel Cells (Auto) 


 


Blood Type  B POSITIVE


 


Antibody Screen  Negative

## 2018-04-29 VITALS — DIASTOLIC BLOOD PRESSURE: 103 MMHG | SYSTOLIC BLOOD PRESSURE: 147 MMHG

## 2018-04-29 RX ADMIN — FENTANYL CITRATE PRN MCG: 50 INJECTION, SOLUTION INTRAMUSCULAR; INTRAVENOUS at 14:08

## 2018-04-29 RX ADMIN — FENTANYL CITRATE PRN MCG: 50 INJECTION, SOLUTION INTRAMUSCULAR; INTRAVENOUS at 09:23

## 2018-04-29 RX ADMIN — OXYTOCIN SCH MLS/HR: 10 INJECTION, SOLUTION INTRAMUSCULAR; INTRAVENOUS at 08:33

## 2018-04-29 RX ADMIN — METHYLDOPA SCH MG: 250 TABLET ORAL at 21:47

## 2018-04-29 RX ADMIN — OXYTOCIN SCH MLS/HR: 10 INJECTION, SOLUTION INTRAMUSCULAR; INTRAVENOUS at 06:05

## 2018-04-29 RX ADMIN — SODIUM CHLORIDE, SODIUM LACTATE, POTASSIUM CHLORIDE, AND CALCIUM CHLORIDE SCH MLS/HR: .6; .31; .03; .02 INJECTION, SOLUTION INTRAVENOUS at 17:10

## 2018-04-29 RX ADMIN — FENTANYL CITRATE PRN MCG: 50 INJECTION, SOLUTION INTRAMUSCULAR; INTRAVENOUS at 06:10

## 2018-04-29 RX ADMIN — METHYLDOPA SCH MG: 250 TABLET ORAL at 10:07

## 2018-04-29 RX ADMIN — FENTANYL CITRATE PRN MCG: 50 INJECTION, SOLUTION INTRAMUSCULAR; INTRAVENOUS at 11:32

## 2018-04-29 RX ADMIN — ONDANSETRON PRN MG: 2 INJECTION INTRAMUSCULAR; INTRAVENOUS at 12:53

## 2018-04-29 NOTE — EVENT NOTE
Date: 04/29/18





Had discussion with patient and she is willing to try another mode. WIll insert 

cytotec this evening.

## 2018-04-29 NOTE — PROGRESS NOTE
Assessment and Plan





HD 2 of the induction for hypertension at 38 weeks. Will continue to try to 

induce labor. Patient now stating that she would prefer a 





Subjective





- Subjective


Date of service: 18


Principal diagnosis: Chronic hypertension


Interval history: 





Patient here for induction of labor due to hypertension. Patient was on 

pitiocin for contractions, but when contractions began to become more intense, 

patient refused to be monitored due to her discomfort, so pitocin had to be 

discontinued per protocol.


Patient reports: contractions, other





Objective





- Vital Signs


Vital Signs: 


 Vital Signs - 12hr











  18





  06:11 08:31 09:14


 


Temperature 97.7 F 97.5 F L 


 


Pulse Rate 60 89 65


 


Respiratory  18 18





Rate   


 


Blood Pressure   


 


Blood Pressure 148/72 142/99 146/86





[Left]   


 


O2 Sat by Pulse  100 100





Oximetry   














  18





  09:23 10:07 11:32


 


Temperature   


 


Pulse Rate  58 L 


 


Respiratory 18  18





Rate   


 


Blood Pressure  139/92 


 


Blood Pressure   





[Left]   


 


O2 Sat by Pulse   





Oximetry   














  18





  13:12 14:08


 


Temperature 97.6 F 


 


Pulse Rate 59 L 


 


Respiratory 18 18





Rate  


 


Blood Pressure  


 


Blood Pressure 156/93 





[Left]  


 


O2 Sat by Pulse 100 





Oximetry  














- Exam


Breasts: deferred


Cardiovascular: Regular rate, Normal S1, Normal S2


Lungs: Clear to auscultation, Normal air movement


Abdomen: Present: normal appearance, soft, normal bowel sounds


Cervical Dilatation: 1


Cervical Effacement Percentage: 60


Fetal station: -3


Uterine Contraction Pattern: Irregular





- Labs


Labs: 


 Abnormal Labs











  18





  15:37 15:37


 


MCH  33 H 


 


MCHC  35 H 


 


RDW  12.7 L 


 


Creatinine   0.5 L


 


ALT   < 5 L


 


Lactate Dehydrogenase   209 H